# Patient Record
Sex: FEMALE | Race: ASIAN | NOT HISPANIC OR LATINO | URBAN - METROPOLITAN AREA
[De-identification: names, ages, dates, MRNs, and addresses within clinical notes are randomized per-mention and may not be internally consistent; named-entity substitution may affect disease eponyms.]

---

## 2017-11-12 ENCOUNTER — EMERGENCY (EMERGENCY)
Facility: HOSPITAL | Age: 82
LOS: 1 days | Discharge: HOME | End: 2017-11-12
Attending: EMERGENCY MEDICINE
Payer: COMMERCIAL

## 2017-11-12 VITALS
RESPIRATION RATE: 16 BRPM | WEIGHT: 78.93 LBS | DIASTOLIC BLOOD PRESSURE: 62 MMHG | SYSTOLIC BLOOD PRESSURE: 150 MMHG | HEART RATE: 64 BPM | OXYGEN SATURATION: 98 % | HEIGHT: 60 IN | TEMPERATURE: 98 F

## 2017-11-12 DIAGNOSIS — S00.83XA CONTUSION OF OTHER PART OF HEAD, INITIAL ENCOUNTER: ICD-10-CM

## 2017-11-12 DIAGNOSIS — E03.9 HYPOTHYROIDISM, UNSPECIFIED: ICD-10-CM
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
## 2017-11-12 DIAGNOSIS — R51 HEADACHE: ICD-10-CM
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
## 2017-11-12 PROCEDURE — 72125 CT NECK SPINE W/O DYE: CPT

## 2017-11-12 PROCEDURE — 99284 EMERGENCY DEPT VISIT MOD MDM: CPT

## 2017-11-12 PROCEDURE — 70450 CT HEAD/BRAIN W/O DYE: CPT

## 2017-11-12 PROCEDURE — 99284 EMERGENCY DEPT VISIT MOD MDM: CPT | Mod: 25

## 2017-11-12 PROCEDURE — 72125 CT NECK SPINE W/O DYE: CPT | Mod: 26

## 2017-11-12 PROCEDURE — 70450 CT HEAD/BRAIN W/O DYE: CPT | Mod: 26

## 2017-11-12 RX ORDER — ACETAMINOPHEN 500 MG
650 TABLET ORAL ONCE
Qty: 0 | Refills: 0 | Status: COMPLETED | OUTPATIENT
Start: 2017-11-12 | End: 2017-11-12

## 2017-11-12 RX ADMIN — Medication 650 MILLIGRAM(S): at 13:52

## 2017-11-12 NOTE — ED PROVIDER NOTE - PROGRESS NOTE DETAILS
simon: eating in ed.  ct head and cervical no acute path.  neurologically stable.  Dx frontal head contusion with mild concussion.  tylenol as needed, ice, rest.  left ambulatory.  return precautions given.

## 2017-11-12 NOTE — ED PROVIDER NOTE - NEUROLOGICAL, MLM
Alert and oriented, no focal deficits, no motor or sensory deficits. Nonataxic gait. 4/5 strength all throughout his extremities.

## 2017-11-12 NOTE — ED PROVIDER NOTE - MEDICAL DECISION MAKING DETAILS
83 y/o F pt s/p mechanical fall that occurred last night. Pt is neurologically intact. Will obtain CT head, cervical, neck due to pt's history of rheumatoid arthritis and age to r/o concussion.

## 2017-11-12 NOTE — ED PROVIDER NOTE - CHPI ED SYMPTOMS NEG
no fever, no chills, no shortness of breath, no cough, no chest pain, no palpitations, no blurry vision, no diarrhea, no abd pain, no dysuria, no urinary frequency, no hematuria, no back pain, no neck pain, no numbness, no tingling, no weakness

## 2017-11-12 NOTE — ED PROVIDER NOTE - CONSTITUTIONAL, MLM
normal... Elderly slender pt, well nourished, awake, alert, oriented to person, place, time/situation and in no apparent distress.

## 2017-11-12 NOTE — ED PROVIDER NOTE - OBJECTIVE STATEMENT
83 y/o F pt with PMHx of rheumatoid arthritis, hypothyroid, not on blood thinners, presents to ED s/p mechanical fall x last night. Pt reports she tripped, lost her balance and landed on her right side; no LOC at the time. Pt notes having 1 episode of vomiting after the fall. Pt has been ambulatory since the fall but continues to have pain on the right side of head. Pt uses walker at baseline. Daughter at bedside states pt is otherwise normal and seems to be at baseline. Pt denies fever, chills, blurry vision, shortness of breath, cough, chest pain, palpitations, diarrhea, abd pain, dysuria, urinary frequency, hematuria, back pain, neck pain, numbness, tingling, weakness, or any other complaints. NKDA.

## 2017-11-12 NOTE — ED ADULT TRIAGE NOTE - CHIEF COMPLAINT QUOTE
States " I lost balance and fell last night  and hit my head on the concrete floor  and vomited after the fall", denied loc

## 2017-11-12 NOTE — ED ADULT NURSE NOTE - OBJECTIVE STATEMENT
85 y/o  female walked in with c/o fell last nignt and hit rt. Forehead on the floor denies any LOC pt alert oriented x 3

## 2017-11-12 NOTE — ED PROVIDER NOTE - MUSCULOSKELETAL, MLM
Spine appears normal, range of motion is not limited, no muscle or joint tenderness, no pelvic instability. Signs of arthritic changes to b/l hands due to rheumatoid arthritis

## 2024-06-07 NOTE — ED ADULT TRIAGE NOTE - BSA (M2)
I have not been able to secure an authorization for Ms. Bernard which is scheduled for 6/10/2024. I am attempting to work with the insurance company. However, case is currently pending medical review. Can it be rescheduled to give additional time to obtain authorization? Please reach out to myself and patient with decision, for I am unable to reschedule and/or cancel services. If you have any further questions, you can contact me at [phone].     Called patient and she stated that she thought that she had received an authorization from her insurance already for this. Patient requested that appointment not be rescheduled until she calls and talks to them.   
1.26

## 2024-12-29 NOTE — ED PROVIDER NOTE - ENMT, MLM
[facility]  ED  EMERGENCY DEPARTMENT ENCOUNTER        Pt Name: Radha Magallon  MRN: [id number]  Birthdate 1936  Date of evaluation: 12/28/2024  Provider: RAGHU Elder  PCP: Baljeet Syed III, APRN - CNP  Note Started: 12:14 AM EST 12/29/24       I have seen and evaluated this patient with my supervising physician Shirley Garcia MD.      CHIEF COMPLAINT       Chief Complaint   Patient presents with    Fatigue     Per EMS called for generalized weakness. EMS states that family feels like patient current status is \"her normal.\" Glucose 115. DNRCC per EMS.       HISTORY OF PRESENT ILLNESS: 1 or more Elements     History from : Family at bedside    Limitations to history : None    Radha Magallon is a 88 y.o. female who presents to the emergency department today complaining of generalized fatigue.  According to the daughter who was called by the nursing home the patient was found slumped over sitting on her walker.  At this point EMS was called.  Family reports they have been with her for the past couple of days and she has been acting normally.  She was exposed to somebody on [address] who was diagnosed with COVID the next day.  She does have a new cough.    Nursing Notes were all reviewed and agreed with or any disagreements were addressed in the HPI.    REVIEW OF SYSTEMS :      Review of Systems    Positives and Pertinent negatives as per HPI.     SURGICAL HISTORY     Past Surgical History:   Procedure Laterality Date    BRONCHOSCOPY      EYE SURGERY      eye lid spot removed    TONSILLECTOMY         CURRENTMEDICATIONS       Previous Medications    BIOFLAVONOID PRODUCTS (BIOFLEX PO)    Take by mouth    CETIRIZINE (ZYRTEC) 10 MG TABLET    Take 1 tablet by mouth daily    CYANOCOBALAMIN 1000 MCG TABLET    Take 1 tablet by mouth every other day    DOCUSATE SODIUM (COLACE) 100 MG CAPSULE    Take 1 capsule by mouth as needed    FLUTICASONE (FLONASE) 50 MCG/ACT NASAL SPRAY    USE 2 
      [facility]  ED     EMERGENCY DEPARTMENT ENCOUNTER     Location: [facility]  ED  12/28/2024  Note Started: 4:03 AM EST 12/29/24      Patient Identification  Radha Magallon is a 88 y.o. female      HPI:Radha Magallon was evaluated in the Emergency Department for fatigue, patient unable to use her walker because she was so weak.  She also has a cough with recent COVID-19 exposure. Although initial history and physical exam information was obtained by EZ/NPP/MD/DO (who also dictated a record of this visit), I personally saw the patient and performed and made/approved the management plan and take responsibility for the patient management.      PHYSICAL EXAM:  Elderly female in no acute distress, she has generalized tremors, lungs are clear bilaterally, normal heart sounds.    EKG Interpretation    Twelve-lead EKG as read and interpreted by myself shows normal sinus rhythm at a rate of 78 beats brooding, peer interval QRS QTc normal per normal axis no acute ischemic changes.  Patient seen and evaluated.  Relevant records reviewed.  MDM: Adult female comes in if worsening weakness and cough.  Positive COVID-19 here.  Given her weakness and also her shortness of breath with BNP elevation decided best course of action is to admit this patient.  Patient and family agreeable with the treatment plan.  Hospitalist notified.    I independently interpreted the following studies: Laboratory studies, EKG    I personally discussed the patients care with EZ    CLINICAL IMPRESSION  1. COVID    2. Elevated troponin    3. Elevated brain natriuretic peptide (BNP) level          IShirley MD, am the primary clinician of record.   I personally saw the patient and independently provided 30 minutes of non-concurrent critical care out of the total shared critical care time excluding separately billable procedures.    This chart was generated in part by using Dragon Dictation system and may contain 
Airway patent, Nasal mucosa clear. Mouth with normal mucosa. Throat has no vesicles, no oropharyngeal exudates and uvula is midline.